# Patient Record
Sex: FEMALE | URBAN - METROPOLITAN AREA
[De-identification: names, ages, dates, MRNs, and addresses within clinical notes are randomized per-mention and may not be internally consistent; named-entity substitution may affect disease eponyms.]

---

## 2021-03-09 ENCOUNTER — NEW PATIENT (OUTPATIENT)
Dept: URBAN - METROPOLITAN AREA CLINIC 56 | Facility: CLINIC | Age: 71
End: 2021-03-09
Payer: MEDICARE

## 2021-03-09 DIAGNOSIS — E11.9 TYPE 2 DIABETES MELLITUS WITHOUT COMPLICATIONS: Primary | ICD-10-CM

## 2021-03-09 DIAGNOSIS — H52.223 REGULAR ASTIGMATISM, BILATERAL: ICD-10-CM

## 2021-03-09 DIAGNOSIS — H25.813 COMBINED FORMS OF AGE-RELATED CATARACT, BILATERAL: ICD-10-CM

## 2021-03-09 DIAGNOSIS — Z79.84 LONG TERM (CURRENT) USE OF ORAL ANTIDIABETIC DRUGS: ICD-10-CM

## 2021-03-09 DIAGNOSIS — H43.813 VITREOUS DEGENERATION, BILATERAL: ICD-10-CM

## 2021-03-09 PROCEDURE — 92004 COMPRE OPH EXAM NEW PT 1/>: CPT | Performed by: OPTOMETRIST

## 2021-03-09 PROCEDURE — 92250 FUNDUS PHOTOGRAPHY W/I&R: CPT | Performed by: OPTOMETRIST

## 2021-03-09 ASSESSMENT — INTRAOCULAR PRESSURE
OD: 16
OS: 16

## 2021-03-09 ASSESSMENT — VISUAL ACUITY
OS: 20/20
OD: 20/25

## 2021-03-09 ASSESSMENT — KERATOMETRY
OD: 43.52
OS: 43.58

## 2022-03-30 ENCOUNTER — OFFICE VISIT (OUTPATIENT)
Dept: URBAN - METROPOLITAN AREA CLINIC 56 | Facility: CLINIC | Age: 72
End: 2022-03-30
Payer: MEDICARE

## 2022-03-30 DIAGNOSIS — H40.033 ANATOMICAL NARROW ANGLE, BILATERAL: Primary | ICD-10-CM

## 2022-03-30 PROCEDURE — 92020 GONIOSCOPY: CPT | Performed by: OPTOMETRIST

## 2022-03-30 PROCEDURE — 99214 OFFICE O/P EST MOD 30 MIN: CPT | Performed by: OPTOMETRIST

## 2022-03-30 ASSESSMENT — INTRAOCULAR PRESSURE
OD: 16
OS: 17

## 2022-03-30 ASSESSMENT — VISUAL ACUITY
OS: 20/30
OD: 20/30

## 2022-03-30 ASSESSMENT — KERATOMETRY
OS: 43.70
OD: 43.63

## 2022-03-30 NOTE — IMPRESSION/PLAN
Impression: Anatomical narrow angle, bilateral: H40.033. Plan: Angles open to TM S/I and SL N/T Discussed with patient and son. Patient having intermittent eye pain OS. Recommend consult for LPI
S/s of narrow angle attack discussed.

## 2022-03-30 NOTE — IMPRESSION/PLAN
Impression: Combined forms of age-related cataract, bilateral: H25.813. Plan:  Discussed cataract diagnosis with the patient. Discussed and reviewed treatment options for cataracts. Risks and benefits of surgical treatment were discussed and understood. Will evaluate cataracts after bilateral LPI.

## 2022-04-05 ENCOUNTER — OFFICE VISIT (OUTPATIENT)
Dept: URBAN - METROPOLITAN AREA CLINIC 44 | Facility: CLINIC | Age: 72
End: 2022-04-05
Payer: MEDICARE

## 2022-04-05 DIAGNOSIS — H11.153 PINGUECULA, BILATERAL: ICD-10-CM

## 2022-04-05 DIAGNOSIS — H10.45 OTHER CHRONIC ALLERGIC CONJUNCTIVITIS: ICD-10-CM

## 2022-04-05 DIAGNOSIS — Z13.5 ENCOUNTER FOR SCREENING FOR EYE DISORDER: ICD-10-CM

## 2022-04-05 DIAGNOSIS — H04.123 DRY EYE SYNDROME OF BILATERAL LACRIMAL GLANDS: ICD-10-CM

## 2022-04-05 PROCEDURE — 92133 CPTRZD OPH DX IMG PST SGM ON: CPT | Performed by: OPHTHALMOLOGY

## 2022-04-05 PROCEDURE — 99204 OFFICE O/P NEW MOD 45 MIN: CPT | Performed by: OPHTHALMOLOGY

## 2022-04-05 PROCEDURE — 92020 GONIOSCOPY: CPT | Performed by: OPHTHALMOLOGY

## 2022-04-05 PROCEDURE — 76514 ECHO EXAM OF EYE THICKNESS: CPT | Performed by: OPHTHALMOLOGY

## 2022-04-05 ASSESSMENT — INTRAOCULAR PRESSURE
OD: 19
OS: 19
OD: 20

## 2022-04-05 NOTE — IMPRESSION/PLAN
Impression: Other chronic allergic conjunctivitis: H10.45. Plan: See W25.014. [id number] Stacey Vivar for patient to use OTC allergy drops if needed.

## 2022-04-05 NOTE — IMPRESSION/PLAN
Impression: Dry eye syndrome of bilateral lacrimal glands: H04.123. Plan: Patient to use AFT QID/PRN OU.

## 2022-04-05 NOTE — IMPRESSION/PLAN
Impression: Pinguecula, bilateral: H11.153. Plan: Nasal/Temporal OU. Will continue to monitor. No treatment needed at this time.

## 2022-04-05 NOTE — IMPRESSION/PLAN
Impression: Encounter for screening for eye disorder: Z13.5. Plan: Dilated with Tropicamide in the office without IOP elevation. Not occludable. Advised patient of regular follow-up.

## 2022-04-05 NOTE — IMPRESSION/PLAN
Impression: Vitreous degeneration, bilateral: H43.813. Plan: Reviewed signs and symptoms of a Retinal Detachment. Patient instructed to call if symptoms should arise.

## 2022-04-05 NOTE — IMPRESSION/PLAN
Impression: Combined forms of age-related cataract, bilateral: H25.813. Plan: Is not occludable. Discussed cataracts with patient. Surgical treatment is recommended. Surgical options, risks and benefits discussed. Patient elects surgical treatment. Recommend surgery OU, OD first.  Patient is candidate/interested in toric lens/astigmatism correction, standard lens, ORA. Aim OD: -0.25. Aim OS: -0.25. No LenSx/MF/Trimoxi. See Shruti Ibrahim to Schedule CE/Phaco/IOL OD Then OS; H&P, AScan, UN Dilated Pre-Op 1st Eye; POD1; POW1; 2nd Eye; POD1; POW1; HII6 [TQEE 1WPOs with Dr Jazmyn Ruiz, 1DPO and 1MPO with Optometry] Clearance: aspirin

## 2022-04-05 NOTE — IMPRESSION/PLAN
Impression: Diabetes mellitus Type 2 without mention of complication: R48.2. Plan: No diabetic retinopathy, NVI, NVA or NVD observed in today's exam. Continue care with PCP/Endo.

## 2022-05-06 ENCOUNTER — OFFICE VISIT (OUTPATIENT)
Dept: URBAN - METROPOLITAN AREA CLINIC 44 | Facility: CLINIC | Age: 72
End: 2022-05-06
Payer: MEDICARE

## 2022-05-06 DIAGNOSIS — Z01.818 ENCOUNTER FOR OTHER PREPROCEDURAL EXAMINATION: Primary | ICD-10-CM

## 2022-05-06 DIAGNOSIS — H25.813 COMBINED FORMS OF AGE-RELATED CATARACT, BILATERAL: ICD-10-CM

## 2022-05-06 PROCEDURE — 99203 OFFICE O/P NEW LOW 30 MIN: CPT | Performed by: PHYSICIAN ASSISTANT

## 2022-05-06 PROCEDURE — 92025 CPTRIZED CORNEAL TOPOGRAPHY: CPT | Performed by: OPHTHALMOLOGY

## 2022-05-06 ASSESSMENT — PACHYMETRY
OS: 22.75
OD: 22.93
OS: 2.71
OD: 2.79

## 2022-05-10 ENCOUNTER — PRE-OPERATIVE VISIT (OUTPATIENT)
Dept: URBAN - METROPOLITAN AREA CLINIC 56 | Facility: CLINIC | Age: 72
End: 2022-05-10
Payer: MEDICARE

## 2022-05-10 DIAGNOSIS — H11.153 PINGUECULA, BILATERAL: ICD-10-CM

## 2022-05-10 DIAGNOSIS — H43.813 VITREOUS DEGENERATION, BILATERAL: ICD-10-CM

## 2022-05-10 PROCEDURE — 99214 OFFICE O/P EST MOD 30 MIN: CPT | Performed by: OPHTHALMOLOGY

## 2022-05-10 RX ORDER — PREDNISOLONE ACETATE 10 MG/ML
1 % SUSPENSION/ DROPS OPHTHALMIC
Qty: 10 | Refills: 1 | Status: ACTIVE
Start: 2022-05-10

## 2022-05-10 RX ORDER — KETOROLAC TROMETHAMINE 5 MG/ML
0.5 % SOLUTION OPHTHALMIC
Qty: 10 | Refills: 1 | Status: ACTIVE
Start: 2022-05-10

## 2022-05-10 RX ORDER — OFLOXACIN 3 MG/ML
0.3 % SOLUTION/ DROPS OPHTHALMIC
Qty: 5 | Refills: 1 | Status: ACTIVE
Start: 2022-05-10

## 2022-05-10 ASSESSMENT — INTRAOCULAR PRESSURE
OD: 21
OS: 21

## 2022-05-10 NOTE — IMPRESSION/PLAN
Impression: Pinguecula, bilateral: H11.153. Plan: Nasal/Temporal OU. Will continue to monitor. No treatment needed at this time. Patient understands condition may limit possible outcome of surgery. clear

## 2022-05-10 NOTE — IMPRESSION/PLAN
Impression: Other chronic allergic conjunctivitis: H10.45. Plan: See R95.285. Masood Granger for patient to use OTC allergy drops if needed. Patient understands condition may limit possible outcome of surgery.

## 2022-05-10 NOTE — IMPRESSION/PLAN
Impression: Bilateral astigmatism: H52.203. Plan: Discussed that LRI will be performed OU per patient request in attempts to lessen the amount astigmatism. Discussed residual astigmatism will be left and glasses will still be needed for BCVA. Patient understands condition may limit possible outcome of surgery.

## 2022-05-10 NOTE — IMPRESSION/PLAN
Impression: Vitreous degeneration, bilateral: H43.813. Plan: Reviewed signs and symptoms of a Retinal Detachment. Patient instructed to call if symptoms should arise. Patient understands condition may limit possible outcome of surgery.

## 2022-05-10 NOTE — IMPRESSION/PLAN
Impression: Dry eye syndrome of bilateral lacrimal glands: H04.123. Plan: Patient to use AFT QID/PRN OU. Patient understands condition may limit possible outcome of surgery.

## 2022-05-19 ENCOUNTER — SURGERY (OUTPATIENT)
Dept: URBAN - METROPOLITAN AREA SURGERY 19 | Facility: SURGERY | Age: 72
End: 2022-05-19
Payer: MEDICARE

## 2022-05-19 DIAGNOSIS — H52.203 UNSPECIFIED ASTIGMATISM, BILATERAL: ICD-10-CM

## 2022-05-19 PROCEDURE — 66984 XCAPSL CTRC RMVL W/O ECP: CPT | Performed by: OPHTHALMOLOGY

## 2022-05-20 ENCOUNTER — POST-OPERATIVE VISIT (OUTPATIENT)
Dept: URBAN - METROPOLITAN AREA CLINIC 56 | Facility: CLINIC | Age: 72
End: 2022-05-20
Payer: MEDICARE

## 2022-05-20 DIAGNOSIS — Z48.810 ENCOUNTER FOR SURGICAL AFTERCARE FOLLOWING SURGERY ON A SENSE ORGAN: Primary | ICD-10-CM

## 2022-05-20 PROCEDURE — 99024 POSTOP FOLLOW-UP VISIT: CPT | Performed by: OPTOMETRIST

## 2022-05-20 ASSESSMENT — INTRAOCULAR PRESSURE: OD: 20

## 2022-05-20 NOTE — IMPRESSION/PLAN
Impression: S/P Cataract Extraction by phacoemulsification with IOL placement; LRI (Limbal Relaxing Incision) OD - 1 Day. Encounter for surgical aftercare following surgery on a sense organ  Z48.810. Plan: S/p Cat Sx OD Aim (-0.25 Continue gtts as instructed. Recommend ATs frequently RTC as scheduled for 1 WK PO with Dr Luis Jimenez

## 2022-05-24 ENCOUNTER — POST-OPERATIVE VISIT (OUTPATIENT)
Dept: URBAN - METROPOLITAN AREA CLINIC 56 | Facility: CLINIC | Age: 72
End: 2022-05-24
Payer: MEDICARE

## 2022-05-24 DIAGNOSIS — H25.812 COMBINED FORMS OF AGE-RELATED CATARACT, LEFT EYE: ICD-10-CM

## 2022-05-24 DIAGNOSIS — H04.123 DRY EYE SYNDROME OF BILATERAL LACRIMAL GLANDS: ICD-10-CM

## 2022-05-24 DIAGNOSIS — E11.9 DIABETES MELLITUS TYPE 2 WITHOUT MENTION OF COMPLICATION: ICD-10-CM

## 2022-05-24 DIAGNOSIS — H10.45 OTHER CHRONIC ALLERGIC CONJUNCTIVITIS: ICD-10-CM

## 2022-05-24 PROCEDURE — 99024 POSTOP FOLLOW-UP VISIT: CPT | Performed by: OPHTHALMOLOGY

## 2022-05-24 ASSESSMENT — VISUAL ACUITY
OD: 20/20
OS: 20/30

## 2022-05-24 ASSESSMENT — INTRAOCULAR PRESSURE
OS: 21
OD: 19

## 2022-05-24 ASSESSMENT — KERATOMETRY
OD: 43.63
OS: 43.60

## 2022-05-24 NOTE — IMPRESSION/PLAN
Impression:  Encounter for surgical aftercare following surgery on a sense organ  Z48.810. Plan: Discussed diagnosis and treatment options in detail. Patient may proceed with second eye cataract surgery based on complaint today. Surgical risks and complications were reviewed today and all questions were answered. Patient reminded of need for glasses for best vision after cataract surgery. Recommend patient continue not rubbing surgical eye for 1 month, no swimming or eye makeup for a total of two weeks. Patient wishes to proceed with surgery of the second eye.
-Drop schedule: Stop Ofloxacin. Continue Ketorolac QID OD [id number] from surgery. Begin taper of Prednisolone OD to TID x1week then BID x1week then QD x1week then stop.

## 2022-05-24 NOTE — IMPRESSION/PLAN
Impression: Other chronic allergic conjunctivitis: H10.45. Plan: See V06.568. [id number] Stacey Vivar for patient to use OTC allergy drops if needed. Patient understands condition may limit possible outcome of surgery.

## 2022-05-24 NOTE — IMPRESSION/PLAN
Impression: Pinguecula, bilateral: H11.153. Plan: Nasal/Temporal OU. Will continue to monitor. No treatment needed at this time. Patient understands condition may limit possible outcome of surgery.

## 2022-05-24 NOTE — IMPRESSION/PLAN
Impression: Combined forms of age-related cataract, left eye: H25.812. Plan: Reviewed cataract diagnosis in detail with patient. Advised patient of treatment options as well as lens options. Discussed risks, benefits, and expectations of cataract surgery. Patient also understands glasses may be necessary for sharpest vision after surgery. Patient understands and accepts that glasses will be necessary following cataract surgery for near and possibly distance. New glasses Rx was not given today. Patient to see Shruti [address]: CE/Phaco/IOL OS 2nd eye [[Aim: -0.25 OS. Std lens; (( NO LRI OS )); NO ORA OU.  No Trimoxi/Dexycu, Use Oflox/Pred/Ket QID OD; BSS+]]

## 2022-05-24 NOTE — IMPRESSION/PLAN
Impression: Diabetes mellitus Type 2 without mention of complication: I02.1. Plan: Did not perform dilated fundus exam today. Continue care with PCP/Endo. Patient understands condition may limit possible outcome of surgery.

## 2022-05-24 NOTE — IMPRESSION/PLAN
Impression: Bilateral astigmatism: H52.203. Plan: No LRI OS - minimal astigmatism. Patient understands condition may limit possible outcome of surgery.

## 2022-06-02 ENCOUNTER — SURGERY (OUTPATIENT)
Dept: URBAN - METROPOLITAN AREA SURGERY 19 | Facility: SURGERY | Age: 72
End: 2022-06-02
Payer: MEDICARE

## 2022-06-02 DIAGNOSIS — H25.812 COMBINED FORMS OF AGE-RELATED CATARACT, LEFT EYE: Primary | ICD-10-CM

## 2022-06-02 PROCEDURE — 66984 XCAPSL CTRC RMVL W/O ECP: CPT | Performed by: OPHTHALMOLOGY

## 2022-06-03 ENCOUNTER — POST-OPERATIVE VISIT (OUTPATIENT)
Dept: URBAN - METROPOLITAN AREA CLINIC 56 | Facility: CLINIC | Age: 72
End: 2022-06-03
Payer: MEDICARE

## 2022-06-03 DIAGNOSIS — Z96.1 PRESENCE OF INTRAOCULAR LENS: Primary | ICD-10-CM

## 2022-06-03 PROCEDURE — 99024 POSTOP FOLLOW-UP VISIT: CPT | Performed by: STUDENT IN AN ORGANIZED HEALTH CARE EDUCATION/TRAINING PROGRAM

## 2022-06-03 ASSESSMENT — INTRAOCULAR PRESSURE: OS: 18

## 2022-07-15 ENCOUNTER — POST-OPERATIVE VISIT (OUTPATIENT)
Dept: URBAN - METROPOLITAN AREA CLINIC 56 | Facility: CLINIC | Age: 72
End: 2022-07-15

## 2022-07-15 DIAGNOSIS — Z96.1 PRESENCE OF INTRAOCULAR LENS: Primary | ICD-10-CM

## 2022-07-15 PROCEDURE — 99024 POSTOP FOLLOW-UP VISIT: CPT | Performed by: OPTOMETRIST

## 2022-07-15 ASSESSMENT — VISUAL ACUITY
OS: 20/20
OD: 20/20

## 2022-07-15 ASSESSMENT — INTRAOCULAR PRESSURE
OS: 18
OD: 18

## 2022-07-15 NOTE — IMPRESSION/PLAN
Impression: S/P Cataract Extraction by phacoemulsification with IOL placement OS - 43 Days. Presence of intraocular lens  Z96.1. Plan: S/p Cat Sx OU Aim (-0.25) Recommend Refresh ATs QID OU Recommend +2.50 OTC readers.  
RTC in 1 year for CE

## 2023-06-15 ENCOUNTER — OFFICE VISIT (OUTPATIENT)
Dept: URBAN - METROPOLITAN AREA CLINIC 56 | Facility: LOCATION | Age: 73
End: 2023-06-15
Payer: COMMERCIAL

## 2023-06-15 DIAGNOSIS — H04.123 DRY EYE SYNDROME OF BILATERAL LACRIMAL GLANDS: ICD-10-CM

## 2023-06-15 DIAGNOSIS — H43.813 VITREOUS DEGENERATION, BILATERAL: ICD-10-CM

## 2023-06-15 DIAGNOSIS — E11.9 TYPE 2 DIABETES MELLITUS WITHOUT COMPLICATIONS: Primary | ICD-10-CM

## 2023-06-15 DIAGNOSIS — Z96.1 PRESENCE OF INTRAOCULAR LENS: ICD-10-CM

## 2023-06-15 PROCEDURE — 92014 COMPRE OPH EXAM EST PT 1/>: CPT | Performed by: STUDENT IN AN ORGANIZED HEALTH CARE EDUCATION/TRAINING PROGRAM

## 2023-06-15 PROCEDURE — 92134 CPTRZ OPH DX IMG PST SGM RTA: CPT | Performed by: STUDENT IN AN ORGANIZED HEALTH CARE EDUCATION/TRAINING PROGRAM

## 2023-06-15 ASSESSMENT — KERATOMETRY
OS: 43.72
OD: 43.52

## 2023-06-15 ASSESSMENT — VISUAL ACUITY
OD: 20/20
OS: 20/20

## 2023-06-15 ASSESSMENT — INTRAOCULAR PRESSURE
OS: 15
OD: 15

## 2023-06-15 NOTE — IMPRESSION/PLAN
Impression: Type 2 diabetes mellitus without complications: J88.8. Plan: single MA near fovea OS, continue strict BG control. F/u with PCP as directed. Call with vision changes.